# Patient Record
Sex: FEMALE | Race: WHITE | URBAN - METROPOLITAN AREA
[De-identification: names, ages, dates, MRNs, and addresses within clinical notes are randomized per-mention and may not be internally consistent; named-entity substitution may affect disease eponyms.]

---

## 2021-07-13 LAB — BCS RECOMMENDATION EXT: NORMAL

## 2022-06-08 LAB
CHOLEST SERPL-MSCNC: 218 MG/DL (ref 0–200)
HBA1C MFR BLD: 6.3 % (ref 4–6)
HDLC SERPL-MCNC: 51 MG/DL (ref 35–70)
LDLC SERPL CALC-MCNC: 73 MG/DL (ref 0–160)
TRIGL SERPL-MCNC: 180 MG/DL (ref 40–160)

## 2022-09-07 LAB — BCS RECOMMENDATION EXT: NORMAL

## 2022-10-04 ENCOUNTER — PATIENT OUTREACH (OUTPATIENT)
Dept: ADMINISTRATIVE | Facility: HOSPITAL | Age: 72
End: 2022-10-04

## 2022-10-04 NOTE — PROGRESS NOTES
Population Health Outreach.Records Received, hyper-linked into chart at this time. The following record(s)  below were uploaded for Health Maintenance .    9/7/2022-mammogram  7/13/2021-mammogram  6/8/2022-HBA1C  6/8/2022-Lipid panel